# Patient Record
Sex: FEMALE | Race: WHITE | Employment: OTHER | URBAN - METROPOLITAN AREA
[De-identification: names, ages, dates, MRNs, and addresses within clinical notes are randomized per-mention and may not be internally consistent; named-entity substitution may affect disease eponyms.]

---

## 2024-04-13 ENCOUNTER — HOSPITAL ENCOUNTER (EMERGENCY)
Age: 59
Discharge: HOME | End: 2024-04-13
Attending: EMERGENCY MEDICINE
Payer: MEDICARE

## 2024-04-13 VITALS
DIASTOLIC BLOOD PRESSURE: 57 MMHG | SYSTOLIC BLOOD PRESSURE: 107 MMHG | OXYGEN SATURATION: 95 % | BODY MASS INDEX: 53.92 KG/M2 | WEIGHT: 293 LBS | TEMPERATURE: 99.4 F | RESPIRATION RATE: 16 BRPM | HEIGHT: 62 IN | HEART RATE: 88 BPM

## 2024-04-13 DIAGNOSIS — E86.0 DEHYDRATION: Primary | ICD-10-CM

## 2024-04-13 LAB
ANION GAP SERPL CALC-SCNC: 13 MMOL/L (ref 2–11)
BUN SERPL-MCNC: 13 MG/DL (ref 6–23)
CALCIUM SERPL-MCNC: 8.7 MG/DL (ref 8.3–10.4)
CHLORIDE SERPL-SCNC: 102 MMOL/L (ref 98–107)
CO2 SERPL-SCNC: 22 MMOL/L (ref 21–32)
CREAT SERPL-MCNC: 1.35 MG/DL (ref 0.6–1)
GLUCOSE SERPL-MCNC: 117 MG/DL (ref 65–100)
POTASSIUM SERPL-SCNC: 4 MMOL/L (ref 3.5–5.1)
SODIUM SERPL-SCNC: 137 MMOL/L (ref 133–143)

## 2024-04-13 PROCEDURE — 99284 EMERGENCY DEPT VISIT MOD MDM: CPT

## 2024-04-13 PROCEDURE — 96361 HYDRATE IV INFUSION ADD-ON: CPT

## 2024-04-13 PROCEDURE — 80048 BASIC METABOLIC PNL TOTAL CA: CPT

## 2024-04-13 PROCEDURE — 96360 HYDRATION IV INFUSION INIT: CPT

## 2024-04-13 PROCEDURE — 2580000003 HC RX 258: Performed by: EMERGENCY MEDICINE

## 2024-04-13 RX ORDER — 0.9 % SODIUM CHLORIDE 0.9 %
1000 INTRAVENOUS SOLUTION INTRAVENOUS
Status: COMPLETED | OUTPATIENT
Start: 2024-04-13 | End: 2024-04-13

## 2024-04-13 RX ADMIN — SODIUM CHLORIDE 1000 ML: 9 INJECTION, SOLUTION INTRAVENOUS at 10:00

## 2024-04-13 ASSESSMENT — PAIN DESCRIPTION - LOCATION: LOCATION: BUTTOCKS

## 2024-04-13 ASSESSMENT — ENCOUNTER SYMPTOMS
BACK PAIN: 0
PHOTOPHOBIA: 0
CHEST TIGHTNESS: 0
EYE REDNESS: 0
VOICE CHANGE: 0
ABDOMINAL PAIN: 0
COLOR CHANGE: 0
CHOKING: 0
NAUSEA: 0

## 2024-04-13 ASSESSMENT — LIFESTYLE VARIABLES
HOW MANY STANDARD DRINKS CONTAINING ALCOHOL DO YOU HAVE ON A TYPICAL DAY: PATIENT DOES NOT DRINK
HOW OFTEN DO YOU HAVE A DRINK CONTAINING ALCOHOL: NEVER

## 2024-04-13 ASSESSMENT — PAIN - FUNCTIONAL ASSESSMENT: PAIN_FUNCTIONAL_ASSESSMENT: 0-10

## 2024-04-13 ASSESSMENT — PAIN SCALES - GENERAL: PAINLEVEL_OUTOF10: 2

## 2024-04-13 NOTE — DISCHARGE INSTRUCTIONS
Try to maintain adequate p.o. liquid intake  Follow-up with your primary care physician  Return to the ER for any new, worsening or life-threatening symptom

## 2024-04-13 NOTE — ED PROVIDER NOTES
Emergency Department Provider Note       PCP: Aren Bales MD   Age: 58 y.o.   Sex: female     DISPOSITION Decision To Discharge 04/13/2024 11:12:35 AM       ICD-10-CM    1. Dehydration  E86.0           Medical Decision Making     Will place IV start IV fluids.  Will check repeat chemistry panel    11:15 AM EDT  Basic metabolic panel is reassuring.  Creatinine is actually improved to 1.3 today from recently being 1.7    She received a liter of IV fluids, remains well-appearing.  Plan for discharge after IV fluids are completed     1 chronic illness with exacerbation.  Shared medical decision making was utilized in creating the patients health plan today.    I independently ordered and reviewed each unique test.  I reviewed external records: ED visit note from an outside group.  I reviewed external records: provider visit note from PCP.  I reviewed external records: provider visit note from outside specialist.  I reviewed external records: previous lab results from outside ED.  I reviewed external records: previous imaging study including radiologist interpretation.                   History     Patient presents the ER with complaints of \"needing an IV and dehydration\".  Patient has a known history of a high output enterocolonic fistula.  States she is probably dehydration.  Apparently she had labs drawn a couple days ago.  Was called last evening by her physician and was told that her creatinine levels were elevated and she needed to get IV fluids.  Patient denies any other complaints.  Reports no blood in her stool.  Denies fevers or chills    The history is provided by the patient.       ROS     Review of Systems   Constitutional:  Negative for fatigue and fever.   HENT:  Negative for congestion, dental problem, tinnitus and voice change.    Eyes:  Negative for photophobia and redness.   Respiratory:  Negative for choking and chest tightness.    Cardiovascular:  Negative for chest pain and leg swelling.        New Prescriptions    No medications on file        No past medical history on file.     Past Surgical History:   Procedure Laterality Date    CT DRAINAGE ABDOM ABSCESS OPEN  4/7/2022    CT DRAINAGE ABDOM ABSCESS OPEN 4/7/2022    CT DRAINAGE ABDOM ABSCESS OPEN  3/16/2022    CT DRAINAGE ABDOM ABSCESS OPEN 3/16/2022    CT DRAINAGE ABDOM ABSCESS OPEN  4/6/2018    CT DRAINAGE ABDOM ABSCESS OPEN 4/6/2018    CT DRAINAGE ABDOM ABSCESS OPEN  2/17/2018    CT DRAINAGE ABDOM ABSCESS OPEN 2/17/2018        Social History     Socioeconomic History    Marital status:         Previous Medications    No medications on file        Results for orders placed or performed during the hospital encounter of 04/13/24   BMP   Result Value Ref Range    Sodium 137 133 - 143 mmol/L    Potassium 4.0 3.5 - 5.1 mmol/L    Chloride 102 98 - 107 mmol/L    CO2 22 21 - 32 mmol/L    Anion Gap 13 (H) 2 - 11 mmol/L    Glucose 117 (H) 65 - 100 mg/dL    BUN 13 6 - 23 MG/DL    Creatinine 1.35 (H) 0.6 - 1.0 MG/DL    Est, Glom Filt Rate 46 (L) >60 ml/min/1.73m2    Calcium 8.7 8.3 - 10.4 MG/DL         No orders to display                No results for input(s): \"COVID19\" in the last 72 hours.    Voice dictation software was used during the making of this note.  This software is not perfect and grammatical and other typographical errors may be present.  This note has not been completely proofread for errors.     Ronald Billings MD  04/13/24 [id number]

## 2024-04-13 NOTE — ED NOTES
Patient mobility status wheelchair. Provider aware     I have reviewed discharge instructions with the patient.  The patient verbalized understanding.    Patient left ED via Discharge Method: wheelchair to Home with  family .    Opportunity for questions and clarification provided.     Patient given 0 scripts.